# Patient Record
Sex: MALE | Race: WHITE | URBAN - METROPOLITAN AREA
[De-identification: names, ages, dates, MRNs, and addresses within clinical notes are randomized per-mention and may not be internally consistent; named-entity substitution may affect disease eponyms.]

---

## 2017-05-08 PROBLEM — Z85820 V10.82: Status: ACTIVE | Noted: 2017-05-08

## 2017-05-31 ENCOUNTER — APPOINTMENT (OUTPATIENT)
Dept: URBAN - METROPOLITAN AREA SURGERY 9 | Age: 82
Setting detail: DERMATOLOGY
End: 2017-05-31

## 2017-05-31 VITALS — RESPIRATION RATE: 14 BRPM | DIASTOLIC BLOOD PRESSURE: 60 MMHG | SYSTOLIC BLOOD PRESSURE: 124 MMHG | HEART RATE: 60 BPM

## 2017-05-31 PROBLEM — E13.9 OTHER SPECIFIED DIABETES MELLITUS WITHOUT COMPLICATIONS: Status: ACTIVE | Noted: 2017-05-31

## 2017-05-31 PROBLEM — C44.311 BASAL CELL CARCINOMA OF SKIN OF NOSE: Status: ACTIVE | Noted: 2017-05-31

## 2017-05-31 PROBLEM — I10 ESSENTIAL (PRIMARY) HYPERTENSION: Status: ACTIVE | Noted: 2017-05-31

## 2017-05-31 PROBLEM — Z85.828 PERSONAL HISTORY OF OTHER MALIGNANT NEOPLASM OF SKIN: Status: ACTIVE | Noted: 2017-05-31

## 2017-05-31 PROCEDURE — OTHER RETURN TO REFERRING PROVIDER: OTHER

## 2017-05-31 PROCEDURE — OTHER MOHS SURGERY: OTHER

## 2017-05-31 PROCEDURE — OTHER CONSULTATION FOR MOHS SURGERY: OTHER

## 2017-05-31 PROCEDURE — 17311 MOHS 1 STAGE H/N/HF/G: CPT

## 2017-05-31 PROCEDURE — 17312 MOHS ADDL STAGE: CPT

## 2017-05-31 NOTE — HPI: MOHS SURGERY CONSULTATION
Has The Cancer Been Biopsied Before?: has been previously biopsied
Who Is Your Referring Provider?: Dr. Carmen
When Was Your Biopsy?: 05/08/2017

## 2017-05-31 NOTE — PROCEDURE: MOHS SURGERY
Body Location Override (Optional - Billing Will Still Be Based On Selected Body Map Location If Applicable): Right lateral nasal tip

## 2017-05-31 NOTE — PROCEDURE: RETURN TO REFERRING PROVIDER
Provider Name, If Known (E.G., Dr. HMAPTON): Dr. Carmen Provider Name, If Known (E.G., Dr. HAMPTON): Dr. Carmen

## 2017-11-13 PROBLEM — Z85820 V10.82: Status: ACTIVE | Noted: 2017-11-13

## 2018-05-14 PROBLEM — Z85820 V10.82: Status: ACTIVE | Noted: 2018-05-14

## 2018-05-16 NOTE — PROCEDURE: MOHS SURGERY
Home O-T Plasty Text: The defect edges were debeveled with a #15 scalpel blade.  Given the location of the defect, shape of the defect and the proximity to free margins an O-T plasty was deemed most appropriate.  Using a sterile surgical marker, an appropriate O-T plasty was drawn incorporating the defect and placing the expected incisions within the relaxed skin tension lines where possible.    The area thus outlined was incised deep to adipose tissue with a #15 scalpel blade.  The skin margins were undermined to an appropriate distance in all directions utilizing iris scissors.

## 2019-11-18 PROBLEM — Z85820 V10.82: Status: ACTIVE | Noted: 2019-11-18

## 2019-12-27 NOTE — PROCEDURE: MOHS SURGERY
n/a n/a Partial Purse String (Simple) Text: Given the location of the defect and the characteristics of the surrounding skin a simple purse string closure was deemed most appropriate.  Undermining was performed circumfirentially around the surgical defect.  A purse string suture was then placed and tightened. Wound tension only allowed a partial closure of the circular defect.

## 2020-05-18 PROBLEM — Z85820 V10.82: Status: ACTIVE | Noted: 2020-05-18

## 2020-06-22 PROBLEM — Z85820 V10.82: Status: ACTIVE | Noted: 2020-06-22

## 2020-07-06 ENCOUNTER — RX ONLY (RX ONLY)
Age: 85
End: 2020-07-06

## 2020-07-20 PROBLEM — Z85820 V10.82: Status: ACTIVE | Noted: 2020-07-20

## 2021-02-01 PROBLEM — Z85820 V10.82: Status: ACTIVE | Noted: 2021-02-01

## 2021-03-08 PROBLEM — Z85820 V10.82: Status: ACTIVE | Noted: 2021-03-08

## 2022-04-26 NOTE — PROCEDURE: MOHS SURGERY
Same Histology In Subsequent Stages Text: The pattern and morphology of the tumor is as described in the first stage. Bexarotene Pregnancy And Lactation Text: This medication is Pregnancy Category X and should not be given to women who are pregnant or may become pregnant. This medication should not be used if you are breast feeding.